# Patient Record
Sex: MALE | Race: WHITE | NOT HISPANIC OR LATINO | URBAN - NONMETROPOLITAN AREA
[De-identification: names, ages, dates, MRNs, and addresses within clinical notes are randomized per-mention and may not be internally consistent; named-entity substitution may affect disease eponyms.]

---

## 2018-01-31 NOTE — PATIENT DISCUSSION
1.  Pseudophakia OU - IOLs stable. Clear capsules ou. Monitor. 2. Dry Eyes OU:  Start artificials tears. Encouraged regular use. 3.  Floaters OD:  Patient was cautioned to call our office immediately if they experience a substantial change in their symptoms such as an increase in floaters persistent flashes loss of visual field 4. Pt wearing BF. Rx change Right Eye. Pt happy with current rx. Pt DOES NOT wear suns and wont. 5.  Hxc Diplopia-  corrected by DR Kenny [id number].   RTN 1 yr CE

## 2019-01-23 NOTE — PATIENT DISCUSSION
1.  PCO  OD (Posterior Capsule Opacification) ---Visually significant at this time. schedule yag OD with DR Castillo--2. Diplopia -   recent onset. Needs 4Bo prism in driving rx. Yag OD first then recheck prism. 3.  Pseudophakia OU - IOLs stable. OD capsule haze OS clear capsule. 4.  Dry Eyes OU:  Start artificials tears. Encouraged regular use. 5.  Floaters OD:  Patient was cautioned to call our office immediately if they experience a substantial change in their symptoms such as an increase in floaters persistent flashes loss of visual field 6. Pt wearing BF.  one pr plano with prism and the pther BF with no prism. Pt DOES NOT wear suns and wont. 7. Hx Diplopia-  corrected by DR Kenny [id number].   Consult DR Hodan Solomon OD yag

## 2019-01-23 NOTE — PATIENT DISCUSSION
1.  Pseudophakia OU - IOLs stable. Clear capsules ou. Monitor. 2. Dry Eyes OU:  Start artificials tears. Encouraged regular use. 3.  Floaters OD:  Patient was cautioned to call our office immediately if they experience a substantial change in their symptoms such as an increase in floaters persistent flashes loss of visual field 4. Pt wearing BF. Rx change Right Eye. Pt happy with current rx. Pt DOES NOT wear suns and wont. 5.  Hxc Diplopia-  corrected by DR Kenny [id number].   RTN 1 yr CE

## 2019-02-05 NOTE — PATIENT DISCUSSION
1.  PCO  OD (Posterior Capsule Opacification)    the patient wants to observe2. Epiretinal Membrane OD - CMT 406mic. observe given still good VA3. Return for an appointment in 1 year for OCT Macula. with Dr. James Quiles

## 2019-09-20 ENCOUNTER — IMPORTED ENCOUNTER (OUTPATIENT)
Dept: URBAN - NONMETROPOLITAN AREA CLINIC 1 | Facility: CLINIC | Age: 29
End: 2019-09-20

## 2019-09-20 PROBLEM — H52.223: Noted: 2019-09-20

## 2019-09-20 PROBLEM — H53.2: Noted: 2019-09-20

## 2019-09-20 PROBLEM — H52.03: Noted: 2019-09-20

## 2019-09-20 PROCEDURE — 92004 COMPRE OPH EXAM NEW PT 1/>: CPT

## 2019-09-20 PROCEDURE — 92015 DETERMINE REFRACTIVE STATE: CPT

## 2019-09-20 PROCEDURE — 92310 CONTACT LENS FITTING OU: CPT

## 2019-09-20 NOTE — PATIENT DISCUSSION
Compound Hyperopic Astigmatism OU -  discussed findings w/patient-  new spectacle/CL Rx issued-  monitor yearly or prn Diplopia-  discussed findings w/patient-  no treatment indicated at this time-  monitor yearly or prn; 's Notes: MR 9/20/2019DFE 9/20/2019

## 2020-03-04 NOTE — PATIENT DISCUSSION
1.  PCO  OD (Posterior Capsule Opacification) ---Visually significant at this time. schedule yag OD with DR Castillo--2. Diplopia -   recent onset. Needs 4Bo prism in driving rx. Yag OD first then recheck prism. 3.  Pseudophakia OU - IOLs stable. OD capsule haze OS clear capsule. 4.  Dry Eyes OU:  Start artificials tears. Encouraged regular use. 5.  Floaters OD:  Patient was cautioned to call our office immediately if they experience a substantial change in their symptoms such as an increase in floaters persistent flashes loss of visual field 6. Pt wearing BF.  one pr plano with prism and the pther BF with no prism. Pt DOES NOT wear suns and wont. 7. Hx Diplopia-  corrected by DR Kenny [id number].   Consult DR Jocelyne Delvalle OD yag

## 2020-03-04 NOTE — PATIENT DISCUSSION
1.  Epiretinal Membrane OD -   /297  UP from prev yr at 406. 2. PCO  OD (Posterior Capsule Opacification) --Mild--defer yag. 3.  Diplopia -   is better-seem to correlate with BP problems. Pt had 4 LISSETTE prism in driving rx. 3.  Pseudophakia OU - IOLs stable. OD capsule haze OS clear capsule. 4.  Dry Eyes OU:  Start artificials tears. Encouraged regular use. 5.  Floaters OD:  Patient was cautioned to call our office immediately if they experience a substantial change in their symptoms such as an increase in floaters persistent flashes loss of visual field 6. Pt wearing BF.  one pr plano with prism and the pther BF with no prism. Pt DOES NOT wear suns and wont. 7. Hx Diplopia-  corrected by DR Kenny [id number].   RTN 1 yr CE/OCT Mac   SP

## 2022-04-10 ASSESSMENT — VISUAL ACUITY
OD_SC: 20/30
OS_SC: 20/20-

## 2022-04-10 ASSESSMENT — TONOMETRY
OS_IOP_MMHG: 16
OD_IOP_MMHG: 14